# Patient Record
Sex: MALE | Employment: FULL TIME | URBAN - METROPOLITAN AREA
[De-identification: names, ages, dates, MRNs, and addresses within clinical notes are randomized per-mention and may not be internally consistent; named-entity substitution may affect disease eponyms.]

---

## 2022-11-21 ENCOUNTER — OFFICE VISIT (OUTPATIENT)
Dept: FAMILY MEDICINE | Facility: CLINIC | Age: 62
End: 2022-11-21

## 2022-11-21 VITALS
RESPIRATION RATE: 20 BRPM | WEIGHT: 204 LBS | DIASTOLIC BLOOD PRESSURE: 82 MMHG | HEIGHT: 71 IN | BODY MASS INDEX: 28.56 KG/M2 | HEART RATE: 64 BPM | OXYGEN SATURATION: 98 % | SYSTOLIC BLOOD PRESSURE: 134 MMHG | TEMPERATURE: 97.9 F

## 2022-11-21 DIAGNOSIS — S83.206D TEAR OF MENISCUS OF RIGHT KNEE AS CURRENT INJURY, UNSPECIFIED MENISCUS, UNSPECIFIED TEAR TYPE, SUBSEQUENT ENCOUNTER: ICD-10-CM

## 2022-11-21 DIAGNOSIS — Z71.89 ACP (ADVANCE CARE PLANNING): ICD-10-CM

## 2022-11-21 DIAGNOSIS — Z76.89 HEALTH CARE HOME: ICD-10-CM

## 2022-11-21 DIAGNOSIS — Z76.89 ENCOUNTER TO ESTABLISH CARE: ICD-10-CM

## 2022-11-21 DIAGNOSIS — Z01.818 PRE-OPERATIVE GENERAL PHYSICAL EXAMINATION: Primary | ICD-10-CM

## 2022-11-21 DIAGNOSIS — E78.2 MIXED HYPERLIPIDEMIA: ICD-10-CM

## 2022-11-21 LAB
BUN SERPL-MCNC: 17 MG/DL (ref 7–25)
BUN/CREATININE RATIO: 13.3 (ref 6–22)
CALCIUM SERPL-MCNC: 9.4 MG/DL (ref 8.6–10.3)
CHLORIDE SERPLBLD-SCNC: 106.5 MMOL/L (ref 98–110)
CO2 SERPL-SCNC: 28.4 MMOL/L (ref 20–32)
CREAT SERPL-MCNC: 1.28 MG/DL (ref 0.6–1.3)
GLUCOSE SERPL-MCNC: 96 MG/DL (ref 60–99)
POTASSIUM SERPL-SCNC: 4.76 MMOL/L (ref 3.5–5.3)
SODIUM SERPL-SCNC: 142.6 MMOL/L (ref 135–146)

## 2022-11-21 PROCEDURE — 36415 COLL VENOUS BLD VENIPUNCTURE: CPT | Performed by: STUDENT IN AN ORGANIZED HEALTH CARE EDUCATION/TRAINING PROGRAM

## 2022-11-21 PROCEDURE — 80048 BASIC METABOLIC PNL TOTAL CA: CPT | Performed by: STUDENT IN AN ORGANIZED HEALTH CARE EDUCATION/TRAINING PROGRAM

## 2022-11-21 PROCEDURE — 99203 OFFICE O/P NEW LOW 30 MIN: CPT | Performed by: STUDENT IN AN ORGANIZED HEALTH CARE EDUCATION/TRAINING PROGRAM

## 2022-11-21 RX ORDER — FLUTICASONE PROPIONATE 50 MCG
SPRAY, SUSPENSION (ML) NASAL
COMMUNITY
Start: 2022-02-09

## 2022-11-21 RX ORDER — ATORVASTATIN CALCIUM 10 MG/1
TABLET, FILM COATED ORAL
COMMUNITY
Start: 2022-09-26

## 2022-11-21 NOTE — PROGRESS NOTES
[facility]  [address] [place] [address]  [address]  [facility] [address]  Phone: [phone]  Fax: [phone]  Primary Provider: Jose Alvarado  Pre-op Performing Provider: JOSE ALVARADO      PREOPERATIVE EVALUATION:  Today's date: 11/21/2022    Iban Kunz is a 62 year old male who presents for a preoperative evaluation.    Surgical Information:  Surgery/Procedure: Right knee meniscus repair surgery   Surgery Location: [facility]  Surgeon: DR. Leo  Surgery Date: 12/2/22  Time of Surgery: 9:15 am  Where patient plans to recover: At home with family  Fax number for surgical facility: [phone]    Type of Anesthesia Anticipated: to be determined    Assessment & Plan     The proposed surgical procedure is considered INTERMEDIATE risk.      ICD-10-CM    1. Pre-operative general physical examination  Z01.818       2. Tear of meniscus of right knee as current injury, unspecified meniscus, unspecified tear type, subsequent encounter  S83.206D       3. ACP (advance care planning)  Z71.89       4. Health Care Home  Z76.89       5. Encounter to establish care  Z76.89       6. Mixed hyperlipidemia  E78.2 Basic Metabolic Panel (BFP)           Risks and Recommendations:  The patient has the following additional risks and recommendations for perioperative complications:   - No identified additional risk factors other than previously addressed    Medication Instructions:  Patient Instructions   Stop NSAIDs (ibuprofen, naproxen, etc) for one week before surgery    Tylenol safe to take as needed, 1000mg 3x/day    Call with any questions or concerns       RECOMMENDATION:  APPROVAL GIVEN to proceed with proposed procedure, without further diagnostic evaluation.    35 minutes spent on the date of the encounter doing chart review, history and exam, documentation and further activities per the note.    Jose Alvarado MD, [facility]      Subjective      HPI related to upcoming procedure: R knee meniscal tear, chronic pain limiting lifestyle    1. No - Have you ever had a heart attack or stroke?  2. No - Have you ever had surgery on your heart or blood vessels, such as a stent, coronary (heart) bypass, or surgery on an artery in the head, neck, heart, or legs?  3. No - Do you have chest pain when you are physically active?  4. No - Do you have a history of heart failure?  5. No - Do you currently have a cold, bronchitis, or symptoms of other respiratory (head and chest) infections?  6. No - Do you have a cough, shortness of breath, or wheezing?  7. No - Do you or anyone in your family have a history of blood clots?  8. No - Do you or anyone in your family have a serious bleeding problem, such as long-lasting bleeding after surgeries or cuts?  9. No - Have you ever had anemia or been told to take iron pills?  10. No - Have you had any abnormal blood loss such as black, tarry or bloody stools, or abnormal vaginal bleeding?  11. No - Have you ever had a blood transfusion?  12. Yes - Are you willing to have a blood transfusion if it is medically needed before, during, or after your surgery?  13. No - Have you or anyone in your family ever had problems with anesthesia (sedation for surgery)?  14. No - Do you have sleep apnea, excessive snoring, or daytime drowsiness?   15. No - Do you have any artifical heart valves or other implanted medical devices, such as a pacemaker, defibrillator, or continuous glucose monitor?  16. No - Do you have any artifical joints?  17. No - Are you allergic to latex?    Health Care Directive:  Patient does not have a Health Care Directive or Living Will: Discussed advance care planning with patient; information given to patient to review.    Status of Chronic Conditions:  See problem list for active medical problems.  Problems all longstanding and stable, except as noted/documented.  See ROS for pertinent symptoms related to these  "conditions.      Review of Systems  12 point ROS performed and negative for new concerns except as mentioned above     Patient Active Problem List    Diagnosis Date Noted     ACP (advance care planning) 2022     Priority: Medium     Copy of healthcare directive will be given to the patient today.       Health Care Home 2022     Priority: Medium      No past medical history on file.  Past Surgical History:   Procedure Laterality Date     NO HISTORY OF SURGERY       Current Outpatient Medications   Medication Sig Dispense Refill     atorvastatin (LIPITOR) 10 MG tablet        fluticasone (FLONASE) 50 MCG/ACT nasal spray          Allergies   Allergen Reactions     Bee Venom         Social History     Tobacco Use     Smoking status: Former     Types: Cigarettes     Quit date:      Years since quittin.9     Smokeless tobacco: Never   Substance Use Topics     Alcohol use: Yes     Alcohol/week: 4.0 standard drinks     Types: 4 Standard drinks or equivalent per week     Family History   Problem Relation Age of Onset     Coronary Artery Disease Mother         in her 90s     Lung Cancer Brother      Prostate Cancer Brother      Anesthesia Reaction No family hx of      Diabetes No family hx of      Cerebrovascular Disease No family hx of      Colon Cancer No family hx of      History   Drug Use Not on file         Objective     /82 (BP Location: Left arm, Patient Position: Sitting, Cuff Size: Adult Large)   Pulse 64   Temp 97.9  F (36.6  C) (Temporal)   Resp 20   Ht 1.803 m (5' 11\")   Wt 92.5 kg (204 lb)   SpO2 98%   BMI 28.45 kg/m      Physical Exam  GENERAL APPEARANCE: healthy, alert and no distress  EYES: Eyes grossly normal to inspection, PERRL and conjunctivae and sclerae normal  HENT: ear canals and TM's normal and nose and mouth without ulcers or lesions  NECK: no adenopathy, no asymmetry, masses, or scars and thyroid normal to palpation  RESP: lungs clear to auscultation - no rales, " rhonchi or wheezes  CV: regular rate and rhythm, normal S1 S2, no S3 or S4 and no murmur, click or rub   ABDOMEN: soft, nontender, no HSM or masses and bowel sounds normal  NEURO: Normal strength and tone, sensory exam grossly normal, mentation intact and speech normal  PSYCH: mentation appears normal and affect normal/bright    No results for input(s): HGB, PLT, INR, NA, POTASSIUM, CR, A1C in the last 37368 hours.     Diagnostics:  Results for orders placed or performed in visit on 11/21/22   Basic Metabolic Panel (BFP)     Status: None   Result Value Ref Range    Carbon Dioxide 28.4 20 - 32 mmol/L    Creatinine 1.28 0.60 - 1.30 mg/dL    Glucose 96 60 - 99 mg/dL    Sodium 142.6 135 - 146 mmol/L    Potassium 4.76 3.5 - 5.3 mmol/L    Chloride 106.5 98 - 110 mmol/L    Urea Nitrogen 17 7 - 25 mg/dL    Calcium 9.4 8.6 - 10.3 mg/dL    BUN/Creatinine Ratio 13.3 6 - 22       No EKG required, no history of coronary heart disease, significant arrhythmia, peripheral arterial disease or other structural heart disease.    Revised Cardiac Risk Index (RCRI):  The patient has the following serious cardiovascular risks for perioperative complications:   - No serious cardiac risks = 0 points     RCRI Interpretation: 0 points: Class I (very low risk - 0.4% complication rate)           Signed Electronically by: JOSE ALVARADO MD  Copy of this evaluation report is provided to requesting physician.

## 2022-11-21 NOTE — PATIENT INSTRUCTIONS
Stop NSAIDs (ibuprofen, naproxen, etc) for one week before surgery    Tylenol safe to take as needed, 1000mg 3x/day    Call with any questions or concerns

## 2022-11-21 NOTE — LETTER
[facility]  [address]  [address]  [facility] [address]  [phone]      November 21, 2022      Iban Kunz  [address] [address]      EMERGENCY CARE PLAN  Presenting Problem Treatment Plan   Questions or concerns during clinic hours I will call the clinic directly:    [facility]  [address] , [address]  [place] [address]  [phone]   Questions or concerns outside clinic hours  I will call the 24 hour line at [phone]   Patient needs to schedule an appointment  I will call the  scheduling line at [phone]   Same day treatment   I will call the clinic first, then  urgent care and/or  express care if needed   Clinic Care Coordinators Cheryle Pastor RN:  [phone]  [facility] Clinical Support Staff: [phone]    Crisis Services:  Behavioral or Mental Health [facility])   [phone]   Emergency treatment--Immediately CALL [phone]

## 2022-11-21 NOTE — NURSING NOTE
Chief Complaint   Patient presents with     New Patient     New patient to this clinic      Pre-Op Exam     DOS 12/2/22, right knee meniscus repair being done by Dr. Leo with [facility], surgery being held at [facility]

## 2022-11-22 ENCOUNTER — TELEPHONE (OUTPATIENT)
Dept: FAMILY MEDICINE | Facility: CLINIC | Age: 62
End: 2022-11-22

## 2022-11-22 NOTE — TELEPHONE ENCOUNTER
Pt called requesting if Dr. Awan could assess the knee MRI done at [facility].  And if there is a recommendation of treatment before needing knee surgery?  He is scheduled on 12/2/22 for knee surgery, Which he is having some second thought and wanting Dr. Awan to review.      The report is on your desk and perhaps you have a login for [facility] imaging?  Routing to Dr. Awan, thanks.      Pt phone # [phone]

## 2022-11-28 NOTE — TELEPHONE ENCOUNTER
Late entry, called pt 11/23 and left VM stating I had not evaluated his knee during preop and am therefore limited in interpreting any MRI findings. I did briefly outline nonop mgmt options for meniscal tears, and would recommend a clinic visit if he would like to discuss further what may be most applicable to him. JOSE ALVARADO MD on 11/28/2022 at 3:56 PM

## 2024-06-17 PROBLEM — Z76.89 HEALTH CARE HOME: Status: RESOLVED | Noted: 2022-11-21 | Resolved: 2024-06-17
